# Patient Record
Sex: FEMALE | Race: WHITE | Employment: OTHER | URBAN - METROPOLITAN AREA
[De-identification: names, ages, dates, MRNs, and addresses within clinical notes are randomized per-mention and may not be internally consistent; named-entity substitution may affect disease eponyms.]

---

## 2017-07-06 PROBLEM — I44.7 LBBB (LEFT BUNDLE BRANCH BLOCK): Status: ACTIVE | Noted: 2017-07-06

## 2017-07-20 PROBLEM — I44.2 INTERMITTENT COMPLETE HEART BLOCK (HCC): Status: ACTIVE | Noted: 2017-07-20

## 2018-01-01 ENCOUNTER — CARE COORDINATION (OUTPATIENT)
Dept: CASE MANAGEMENT | Age: 83
End: 2018-01-01

## 2018-01-01 ENCOUNTER — TELEPHONE (OUTPATIENT)
Dept: CARDIOLOGY CLINIC | Age: 83
End: 2018-01-01

## 2018-01-01 ENCOUNTER — CARE COORDINATION (OUTPATIENT)
Dept: CARE COORDINATION | Age: 83
End: 2018-01-01

## 2018-01-01 ENCOUNTER — HOSPITAL ENCOUNTER (INPATIENT)
Age: 83
LOS: 4 days | Discharge: NURSING, REHAB OR LONG-TERM CARE | DRG: 281 | End: 2018-10-15
Attending: EMERGENCY MEDICINE | Admitting: INTERNAL MEDICINE
Payer: MEDICARE

## 2018-01-01 ENCOUNTER — APPOINTMENT (OUTPATIENT)
Dept: GENERAL RADIOLOGY | Age: 83
DRG: 281 | End: 2018-01-01
Payer: MEDICARE

## 2018-01-01 VITALS
HEART RATE: 68 BPM | RESPIRATION RATE: 18 BRPM | BODY MASS INDEX: 23.6 KG/M2 | OXYGEN SATURATION: 96 % | DIASTOLIC BLOOD PRESSURE: 63 MMHG | WEIGHT: 133.2 LBS | SYSTOLIC BLOOD PRESSURE: 146 MMHG | HEIGHT: 63 IN | TEMPERATURE: 98.5 F

## 2018-01-01 DIAGNOSIS — I21.3 ST ELEVATION MYOCARDIAL INFARCTION (STEMI), UNSPECIFIED ARTERY (HCC): Primary | ICD-10-CM

## 2018-01-01 LAB
ANION GAP SERPL CALCULATED.3IONS-SCNC: 17 MMOL/L (ref 7–16)
ANTI-XA LMW HEPARIN: 0.17 U/ML (ref 0.5–1.1)
ANTI-XA LMW HEPARIN: 0.47 U/ML (ref 0.5–1.1)
ANTI-XA LMW HEPARIN: <0.1 U/ML (ref 0.5–1.1)
BASOPHILS ABSOLUTE: 0.04 E9/L (ref 0–0.2)
BASOPHILS RELATIVE PERCENT: 0.4 % (ref 0–2)
BUN BLDV-MCNC: 23 MG/DL (ref 8–23)
CALCIUM SERPL-MCNC: 9.8 MG/DL (ref 8.6–10.2)
CHLORIDE BLD-SCNC: 101 MMOL/L (ref 98–107)
CO2: 22 MMOL/L (ref 22–29)
CREAT SERPL-MCNC: 0.8 MG/DL (ref 0.5–1)
EKG ATRIAL RATE: 61 BPM
EKG P AXIS: 75 DEGREES
EKG P-R INTERVAL: 204 MS
EKG Q-T INTERVAL: 470 MS
EKG QRS DURATION: 142 MS
EKG QTC CALCULATION (BAZETT): 473 MS
EKG R AXIS: 42 DEGREES
EKG T AXIS: 101 DEGREES
EKG VENTRICULAR RATE: 61 BPM
EOSINOPHILS ABSOLUTE: 0.04 E9/L (ref 0.05–0.5)
EOSINOPHILS RELATIVE PERCENT: 0.4 % (ref 0–6)
GFR AFRICAN AMERICAN: >60
GFR NON-AFRICAN AMERICAN: >60 ML/MIN/1.73
GLUCOSE BLD-MCNC: 152 MG/DL (ref 74–109)
HCT VFR BLD CALC: 43.1 % (ref 34–48)
HEMOGLOBIN: 13.7 G/DL (ref 11.5–15.5)
IMMATURE GRANULOCYTES #: 0.03 E9/L
IMMATURE GRANULOCYTES %: 0.3 % (ref 0–5)
INR BLD: 1
LACTIC ACID: 2.9 MMOL/L (ref 0.5–2.2)
LV EF: 60 %
LVEF MODALITY: NORMAL
LYMPHOCYTES ABSOLUTE: 1.61 E9/L (ref 1.5–4)
LYMPHOCYTES RELATIVE PERCENT: 17.2 % (ref 20–42)
MCH RBC QN AUTO: 31.7 PG (ref 26–35)
MCHC RBC AUTO-ENTMCNC: 31.8 % (ref 32–34.5)
MCV RBC AUTO: 99.8 FL (ref 80–99.9)
METER GLUCOSE: 137 MG/DL (ref 70–110)
METER GLUCOSE: 155 MG/DL (ref 70–110)
MONOCYTES ABSOLUTE: 0.74 E9/L (ref 0.1–0.95)
MONOCYTES RELATIVE PERCENT: 7.9 % (ref 2–12)
NEUTROPHILS ABSOLUTE: 6.89 E9/L (ref 1.8–7.3)
NEUTROPHILS RELATIVE PERCENT: 73.8 % (ref 43–80)
PDW BLD-RTO: 14.1 FL (ref 11.5–15)
PLATELET # BLD: 190 E9/L (ref 130–450)
PMV BLD AUTO: 10.2 FL (ref 7–12)
POTASSIUM SERPL-SCNC: 4.1 MMOL/L (ref 3.5–5)
PRO-BNP: 155 PG/ML (ref 0–450)
PROTHROMBIN TIME: 11.8 SEC (ref 9.3–12.4)
RBC # BLD: 4.32 E12/L (ref 3.5–5.5)
SODIUM BLD-SCNC: 140 MMOL/L (ref 132–146)
TROPONIN: 0.79 NG/ML (ref 0–0.03)
TROPONIN: 4.93 NG/ML (ref 0–0.03)
TROPONIN: <0.01 NG/ML (ref 0–0.03)
WBC # BLD: 9.4 E9/L (ref 4.5–11.5)

## 2018-01-01 PROCEDURE — 2140000000 HC CCU INTERMEDIATE R&B

## 2018-01-01 PROCEDURE — 80048 BASIC METABOLIC PNL TOTAL CA: CPT

## 2018-01-01 PROCEDURE — 99223 1ST HOSP IP/OBS HIGH 75: CPT | Performed by: INTERNAL MEDICINE

## 2018-01-01 PROCEDURE — 6370000000 HC RX 637 (ALT 250 FOR IP): Performed by: INTERNAL MEDICINE

## 2018-01-01 PROCEDURE — 85025 COMPLETE CBC W/AUTO DIFF WBC: CPT

## 2018-01-01 PROCEDURE — 99285 EMERGENCY DEPT VISIT HI MDM: CPT

## 2018-01-01 PROCEDURE — 99232 SBSQ HOSP IP/OBS MODERATE 35: CPT | Performed by: INTERNAL MEDICINE

## 2018-01-01 PROCEDURE — 84484 ASSAY OF TROPONIN QUANT: CPT

## 2018-01-01 PROCEDURE — 85610 PROTHROMBIN TIME: CPT

## 2018-01-01 PROCEDURE — 6360000002 HC RX W HCPCS: Performed by: EMERGENCY MEDICINE

## 2018-01-01 PROCEDURE — 85520 HEPARIN ASSAY: CPT

## 2018-01-01 PROCEDURE — 96375 TX/PRO/DX INJ NEW DRUG ADDON: CPT

## 2018-01-01 PROCEDURE — 93005 ELECTROCARDIOGRAM TRACING: CPT | Performed by: EMERGENCY MEDICINE

## 2018-01-01 PROCEDURE — 93306 TTE W/DOPPLER COMPLETE: CPT

## 2018-01-01 PROCEDURE — 96374 THER/PROPH/DIAG INJ IV PUSH: CPT

## 2018-01-01 PROCEDURE — 83605 ASSAY OF LACTIC ACID: CPT

## 2018-01-01 PROCEDURE — 6360000002 HC RX W HCPCS: Performed by: INTERNAL MEDICINE

## 2018-01-01 PROCEDURE — APPSS45 APP SPLIT SHARED TIME 31-45 MINUTES: Performed by: PHYSICIAN ASSISTANT

## 2018-01-01 PROCEDURE — 82962 GLUCOSE BLOOD TEST: CPT

## 2018-01-01 PROCEDURE — 71045 X-RAY EXAM CHEST 1 VIEW: CPT

## 2018-01-01 PROCEDURE — 83880 ASSAY OF NATRIURETIC PEPTIDE: CPT

## 2018-01-01 PROCEDURE — 6370000000 HC RX 637 (ALT 250 FOR IP): Performed by: EMERGENCY MEDICINE

## 2018-01-01 PROCEDURE — 36415 COLL VENOUS BLD VENIPUNCTURE: CPT

## 2018-01-01 PROCEDURE — 94761 N-INVAS EAR/PLS OXIMETRY MLT: CPT

## 2018-01-01 RX ORDER — FENTANYL CITRATE 50 UG/ML
50 INJECTION, SOLUTION INTRAMUSCULAR; INTRAVENOUS ONCE
Status: DISCONTINUED | OUTPATIENT
Start: 2018-01-01 | End: 2018-01-01 | Stop reason: HOSPADM

## 2018-01-01 RX ORDER — ISOSORBIDE MONONITRATE 30 MG/1
30 TABLET, EXTENDED RELEASE ORAL DAILY
Status: DISCONTINUED | OUTPATIENT
Start: 2018-01-01 | End: 2018-01-01 | Stop reason: HOSPADM

## 2018-01-01 RX ORDER — METOPROLOL SUCCINATE 25 MG/1
25 TABLET, EXTENDED RELEASE ORAL DAILY
Status: DISCONTINUED | OUTPATIENT
Start: 2018-01-01 | End: 2018-01-01 | Stop reason: HOSPADM

## 2018-01-01 RX ORDER — CLOPIDOGREL BISULFATE 75 MG/1
300 TABLET ORAL ONCE
Status: COMPLETED | OUTPATIENT
Start: 2018-01-01 | End: 2018-01-01

## 2018-01-01 RX ORDER — CLONIDINE HYDROCHLORIDE 0.1 MG/1
0.05 TABLET ORAL 2 TIMES DAILY
Status: DISCONTINUED | OUTPATIENT
Start: 2018-01-01 | End: 2018-01-01

## 2018-01-01 RX ORDER — M-VIT,TX,IRON,MINS/CALC/FOLIC 27MG-0.4MG
1 TABLET ORAL DAILY
Status: DISCONTINUED | OUTPATIENT
Start: 2018-01-01 | End: 2018-01-01 | Stop reason: HOSPADM

## 2018-01-01 RX ORDER — CLOPIDOGREL BISULFATE 75 MG/1
75 TABLET ORAL DAILY
Status: DISCONTINUED | OUTPATIENT
Start: 2018-01-01 | End: 2018-01-01 | Stop reason: HOSPADM

## 2018-01-01 RX ORDER — ACETAMINOPHEN 500 MG
500 TABLET ORAL EVERY 6 HOURS PRN
Status: DISCONTINUED | OUTPATIENT
Start: 2018-01-01 | End: 2018-01-01 | Stop reason: HOSPADM

## 2018-01-01 RX ORDER — METOPROLOL SUCCINATE 25 MG/1
25 TABLET, EXTENDED RELEASE ORAL DAILY
Qty: 30 TABLET | Refills: 3 | Status: SHIPPED | OUTPATIENT
Start: 2018-01-01

## 2018-01-01 RX ORDER — CLOPIDOGREL BISULFATE 75 MG/1
75 TABLET ORAL DAILY
Qty: 30 TABLET | Refills: 3 | Status: SHIPPED | OUTPATIENT
Start: 2018-01-01

## 2018-01-01 RX ORDER — ISOSORBIDE MONONITRATE 30 MG/1
30 TABLET, EXTENDED RELEASE ORAL DAILY
Qty: 30 TABLET | Refills: 3 | Status: SHIPPED | OUTPATIENT
Start: 2018-01-01

## 2018-01-01 RX ORDER — AMLODIPINE BESYLATE 10 MG/1
10 TABLET ORAL DAILY
Status: DISCONTINUED | OUTPATIENT
Start: 2018-01-01 | End: 2018-01-01 | Stop reason: HOSPADM

## 2018-01-01 RX ORDER — METOPROLOL SUCCINATE 25 MG/1
12.5 TABLET, EXTENDED RELEASE ORAL DAILY
Status: DISCONTINUED | OUTPATIENT
Start: 2018-01-01 | End: 2018-01-01

## 2018-01-01 RX ORDER — HEPARIN SODIUM 10000 [USP'U]/ML
60 INJECTION, SOLUTION INTRAVENOUS; SUBCUTANEOUS ONCE
Status: DISCONTINUED | OUTPATIENT
Start: 2018-01-01 | End: 2018-01-01

## 2018-01-01 RX ORDER — LEVOTHYROXINE SODIUM 0.2 MG/1
200 TABLET ORAL DAILY
Status: DISCONTINUED | OUTPATIENT
Start: 2018-01-01 | End: 2018-01-01 | Stop reason: HOSPADM

## 2018-01-01 RX ORDER — HEPARIN SODIUM 10000 [USP'U]/100ML
12 INJECTION, SOLUTION INTRAVENOUS CONTINUOUS
Status: DISCONTINUED | OUTPATIENT
Start: 2018-01-01 | End: 2018-01-01

## 2018-01-01 RX ORDER — CALCIUM CARBONATE 200(500)MG
500 TABLET,CHEWABLE ORAL DAILY
Status: DISCONTINUED | OUTPATIENT
Start: 2018-01-01 | End: 2018-01-01 | Stop reason: HOSPADM

## 2018-01-01 RX ORDER — LOSARTAN POTASSIUM 25 MG/1
25 TABLET ORAL DAILY
Status: DISCONTINUED | OUTPATIENT
Start: 2018-01-01 | End: 2018-01-01 | Stop reason: HOSPADM

## 2018-01-01 RX ORDER — FENTANYL CITRATE 50 UG/ML
50 INJECTION, SOLUTION INTRAMUSCULAR; INTRAVENOUS ONCE
Status: COMPLETED | OUTPATIENT
Start: 2018-01-01 | End: 2018-01-01

## 2018-01-01 RX ADMIN — ACETAMINOPHEN 500 MG: 500 TABLET, FILM COATED ORAL at 08:54

## 2018-01-01 RX ADMIN — NITROGLYCERIN 0.5 INCH: 20 OINTMENT TOPICAL at 17:59

## 2018-01-01 RX ADMIN — CLOPIDOGREL BISULFATE 75 MG: 75 TABLET ORAL at 08:51

## 2018-01-01 RX ADMIN — LOSARTAN POTASSIUM 25 MG: 25 TABLET, FILM COATED ORAL at 09:19

## 2018-01-01 RX ADMIN — AMLODIPINE BESYLATE 10 MG: 10 TABLET ORAL at 08:51

## 2018-01-01 RX ADMIN — METOPROLOL SUCCINATE 12.5 MG: 25 TABLET, FILM COATED, EXTENDED RELEASE ORAL at 11:29

## 2018-01-01 RX ADMIN — NITROGLYCERIN 1 INCH: 20 OINTMENT TOPICAL at 00:50

## 2018-01-01 RX ADMIN — METOPROLOL SUCCINATE 12.5 MG: 25 TABLET, FILM COATED, EXTENDED RELEASE ORAL at 09:22

## 2018-01-01 RX ADMIN — MULTIPLE VITAMINS W/ MINERALS TAB 1 TABLET: TAB at 08:14

## 2018-01-01 RX ADMIN — MULTIPLE VITAMINS W/ MINERALS TAB 1 TABLET: TAB at 09:19

## 2018-01-01 RX ADMIN — METOPROLOL SUCCINATE 12.5 MG: 25 TABLET, FILM COATED, EXTENDED RELEASE ORAL at 08:51

## 2018-01-01 RX ADMIN — CALCIUM CARBONATE (ANTACID) CHEW TAB 500 MG 500 MG: 500 CHEW TAB at 08:31

## 2018-01-01 RX ADMIN — NITROGLYCERIN 0.5 INCH: 20 OINTMENT TOPICAL at 11:29

## 2018-01-01 RX ADMIN — NITROGLYCERIN 0.5 INCH: 20 OINTMENT TOPICAL at 00:23

## 2018-01-01 RX ADMIN — ACETAMINOPHEN 500 MG: 500 TABLET, FILM COATED ORAL at 20:32

## 2018-01-01 RX ADMIN — AMLODIPINE BESYLATE 10 MG: 10 TABLET ORAL at 09:19

## 2018-01-01 RX ADMIN — AMLODIPINE BESYLATE 10 MG: 10 TABLET ORAL at 20:32

## 2018-01-01 RX ADMIN — MULTIPLE VITAMINS W/ MINERALS TAB 1 TABLET: TAB at 08:32

## 2018-01-01 RX ADMIN — NITROGLYCERIN 0.5 INCH: 20 OINTMENT TOPICAL at 18:07

## 2018-01-01 RX ADMIN — CLONIDINE HYDROCHLORIDE 0.05 MG: 0.1 TABLET ORAL at 20:35

## 2018-01-01 RX ADMIN — SERTRALINE 50 MG: 50 TABLET, FILM COATED ORAL at 21:59

## 2018-01-01 RX ADMIN — CLOPIDOGREL BISULFATE 75 MG: 75 TABLET ORAL at 11:29

## 2018-01-01 RX ADMIN — CLOPIDOGREL BISULFATE 300 MG: 75 TABLET ORAL at 15:17

## 2018-01-01 RX ADMIN — CLONIDINE HYDROCHLORIDE 0.05 MG: 0.1 TABLET ORAL at 08:14

## 2018-01-01 RX ADMIN — NITROGLYCERIN 0.5 INCH: 20 OINTMENT TOPICAL at 06:00

## 2018-01-01 RX ADMIN — AMLODIPINE BESYLATE 10 MG: 10 TABLET ORAL at 08:14

## 2018-01-01 RX ADMIN — NITROGLYCERIN 0.5 INCH: 20 OINTMENT TOPICAL at 00:17

## 2018-01-01 RX ADMIN — NITROGLYCERIN 0.5 INCH: 20 OINTMENT TOPICAL at 17:20

## 2018-01-01 RX ADMIN — SERTRALINE 50 MG: 50 TABLET, FILM COATED ORAL at 20:05

## 2018-01-01 RX ADMIN — NITROGLYCERIN 0.5 INCH: 20 OINTMENT TOPICAL at 12:08

## 2018-01-01 RX ADMIN — LEVOTHYROXINE SODIUM 200 MCG: 200 TABLET ORAL at 06:00

## 2018-01-01 RX ADMIN — LOSARTAN POTASSIUM 25 MG: 25 TABLET, FILM COATED ORAL at 20:32

## 2018-01-01 RX ADMIN — NITROGLYCERIN 1 INCH: 20 OINTMENT TOPICAL at 05:00

## 2018-01-01 RX ADMIN — CLOPIDOGREL BISULFATE 75 MG: 75 TABLET ORAL at 09:19

## 2018-01-01 RX ADMIN — ACETAMINOPHEN 500 MG: 500 TABLET, FILM COATED ORAL at 11:11

## 2018-01-01 RX ADMIN — LOSARTAN POTASSIUM 25 MG: 25 TABLET, FILM COATED ORAL at 08:51

## 2018-01-01 RX ADMIN — LOSARTAN POTASSIUM 25 MG: 25 TABLET, FILM COATED ORAL at 08:32

## 2018-01-01 RX ADMIN — NITROGLYCERIN 0.5 INCH: 20 OINTMENT TOPICAL at 06:28

## 2018-01-01 RX ADMIN — NITROGLYCERIN 0.5 INCH: 20 OINTMENT TOPICAL at 06:22

## 2018-01-01 RX ADMIN — FENTANYL CITRATE 50 MCG: 50 INJECTION, SOLUTION INTRAMUSCULAR; INTRAVENOUS at 15:17

## 2018-01-01 RX ADMIN — METOPROLOL SUCCINATE 12.5 MG: 25 TABLET, FILM COATED, EXTENDED RELEASE ORAL at 08:32

## 2018-01-01 RX ADMIN — LOSARTAN POTASSIUM 25 MG: 25 TABLET, FILM COATED ORAL at 08:14

## 2018-01-01 RX ADMIN — CALCIUM CARBONATE (ANTACID) CHEW TAB 500 MG 500 MG: 500 CHEW TAB at 09:19

## 2018-01-01 RX ADMIN — NITROGLYCERIN 0.5 INCH: 20 OINTMENT TOPICAL at 23:31

## 2018-01-01 RX ADMIN — SERTRALINE 50 MG: 50 TABLET, FILM COATED ORAL at 21:54

## 2018-01-01 RX ADMIN — AMLODIPINE BESYLATE 10 MG: 10 TABLET ORAL at 08:32

## 2018-01-01 RX ADMIN — MULTIPLE VITAMINS W/ MINERALS TAB 1 TABLET: TAB at 08:51

## 2018-01-01 RX ADMIN — SERTRALINE 50 MG: 50 TABLET, FILM COATED ORAL at 20:32

## 2018-01-01 RX ADMIN — CALCIUM CARBONATE (ANTACID) CHEW TAB 500 MG 500 MG: 500 CHEW TAB at 12:55

## 2018-01-01 RX ADMIN — ISOSORBIDE MONONITRATE 30 MG: 30 TABLET, EXTENDED RELEASE ORAL at 13:27

## 2018-01-01 RX ADMIN — ENOXAPARIN SODIUM 60 MG: 60 INJECTION SUBCUTANEOUS at 20:32

## 2018-01-01 RX ADMIN — LEVOTHYROXINE SODIUM 200 MCG: 200 TABLET ORAL at 06:22

## 2018-01-01 RX ADMIN — NITROGLYCERIN 0.5 INCH: 20 OINTMENT TOPICAL at 12:54

## 2018-01-01 RX ADMIN — LEVOTHYROXINE SODIUM 200 MCG: 200 TABLET ORAL at 06:28

## 2018-01-01 RX ADMIN — CLOPIDOGREL BISULFATE 75 MG: 75 TABLET ORAL at 08:32

## 2018-01-01 RX ADMIN — PROCHLORPERAZINE EDISYLATE 10 MG: 5 INJECTION INTRAMUSCULAR; INTRAVENOUS at 15:35

## 2018-01-01 ASSESSMENT — PAIN DESCRIPTION - ONSET
ONSET: ON-GOING
ONSET: ON-GOING
ONSET: GRADUAL
ONSET: ON-GOING

## 2018-01-01 ASSESSMENT — PAIN SCALES - GENERAL
PAINLEVEL_OUTOF10: 2
PAINLEVEL_OUTOF10: 10
PAINLEVEL_OUTOF10: 0
PAINLEVEL_OUTOF10: 1
PAINLEVEL_OUTOF10: 0
PAINLEVEL_OUTOF10: 0
PAINLEVEL_OUTOF10: 7
PAINLEVEL_OUTOF10: 0
PAINLEVEL_OUTOF10: 3
PAINLEVEL_OUTOF10: 2
PAINLEVEL_OUTOF10: 0
PAINLEVEL_OUTOF10: 0
PAINLEVEL_OUTOF10: 3

## 2018-01-01 ASSESSMENT — PAIN DESCRIPTION - PAIN TYPE
TYPE: ACUTE PAIN
TYPE: ACUTE PAIN
TYPE: CHRONIC PAIN
TYPE: CHRONIC PAIN
TYPE: ACUTE PAIN
TYPE: ACUTE PAIN

## 2018-01-01 ASSESSMENT — PAIN DESCRIPTION - LOCATION
LOCATION: CHEST
LOCATION: LEG
LOCATION: HEAD
LOCATION: LEG
LOCATION: CHEST
LOCATION: ABDOMEN

## 2018-01-01 ASSESSMENT — PAIN DESCRIPTION - DESCRIPTORS
DESCRIPTORS: ACHING;DULL;DISCOMFORT
DESCRIPTORS: ACHING;DISCOMFORT;DULL
DESCRIPTORS: ACHING;DISCOMFORT;THROBBING
DESCRIPTORS: DISCOMFORT
DESCRIPTORS: ACHING;DISCOMFORT;THROBBING

## 2018-01-01 ASSESSMENT — PAIN DESCRIPTION - FREQUENCY
FREQUENCY: INTERMITTENT
FREQUENCY: CONTINUOUS
FREQUENCY: INTERMITTENT
FREQUENCY: INTERMITTENT

## 2018-01-01 ASSESSMENT — PAIN DESCRIPTION - PROGRESSION
CLINICAL_PROGRESSION: NOT CHANGED
CLINICAL_PROGRESSION: GRADUALLY IMPROVING
CLINICAL_PROGRESSION: GRADUALLY WORSENING
CLINICAL_PROGRESSION: NOT CHANGED

## 2018-01-01 ASSESSMENT — PAIN DESCRIPTION - ORIENTATION
ORIENTATION: MID
ORIENTATION: RIGHT;LEFT
ORIENTATION: MID
ORIENTATION: LEFT
ORIENTATION: RIGHT;LEFT

## 2018-01-01 ASSESSMENT — ENCOUNTER SYMPTOMS
SHORTNESS OF BREATH: 1
BACK PAIN: 0
ABDOMINAL PAIN: 0

## 2018-06-26 ENCOUNTER — OFFICE VISIT (OUTPATIENT)
Dept: NON INVASIVE DIAGNOSTICS | Age: 83
End: 2018-06-26
Payer: MEDICARE

## 2018-06-26 VITALS
HEART RATE: 78 BPM | RESPIRATION RATE: 16 BRPM | BODY MASS INDEX: 24.84 KG/M2 | WEIGHT: 135 LBS | SYSTOLIC BLOOD PRESSURE: 144 MMHG | HEIGHT: 62 IN | DIASTOLIC BLOOD PRESSURE: 58 MMHG

## 2018-06-26 DIAGNOSIS — I44.2 INTERMITTENT COMPLETE HEART BLOCK (HCC): Primary | ICD-10-CM

## 2018-06-26 DIAGNOSIS — I44.7 LBBB (LEFT BUNDLE BRANCH BLOCK): ICD-10-CM

## 2018-06-26 PROCEDURE — 1090F PRES/ABSN URINE INCON ASSESS: CPT | Performed by: INTERNAL MEDICINE

## 2018-06-26 PROCEDURE — 93000 ELECTROCARDIOGRAM COMPLETE: CPT | Performed by: INTERNAL MEDICINE

## 2018-06-26 PROCEDURE — 99213 OFFICE O/P EST LOW 20 MIN: CPT | Performed by: INTERNAL MEDICINE

## 2018-06-26 PROCEDURE — G8427 DOCREV CUR MEDS BY ELIG CLIN: HCPCS | Performed by: INTERNAL MEDICINE

## 2018-06-26 PROCEDURE — 4040F PNEUMOC VAC/ADMIN/RCVD: CPT | Performed by: INTERNAL MEDICINE

## 2018-06-26 PROCEDURE — 1123F ACP DISCUSS/DSCN MKR DOCD: CPT | Performed by: INTERNAL MEDICINE

## 2018-06-26 PROCEDURE — G8599 NO ASA/ANTIPLAT THER USE RNG: HCPCS | Performed by: INTERNAL MEDICINE

## 2018-06-26 PROCEDURE — 1036F TOBACCO NON-USER: CPT | Performed by: INTERNAL MEDICINE

## 2018-06-26 PROCEDURE — G8420 CALC BMI NORM PARAMETERS: HCPCS | Performed by: INTERNAL MEDICINE

## 2018-06-26 RX ORDER — LOSARTAN POTASSIUM 25 MG/1
TABLET ORAL
Refills: 1 | COMMUNITY
Start: 2018-05-28

## 2018-06-27 ASSESSMENT — ENCOUNTER SYMPTOMS
ORTHOPNEA: 0
WHEEZING: 0
SHORTNESS OF BREATH: 0
COUGH: 0

## 2018-10-11 PROBLEM — I21.3 STEMI (ST ELEVATION MYOCARDIAL INFARCTION) (HCC): Status: ACTIVE | Noted: 2018-01-01

## 2018-10-11 NOTE — ED PROVIDER NOTES
Neurological: She is alert and oriented to person, place, and time. No cranial nerve deficit. Skin: Skin is warm and dry. Psychiatric: She has a normal mood and affect. Her behavior is normal.   Nursing note and vitals reviewed. Procedures    MDM  Number of Diagnoses or Management Options  ST elevation myocardial infarction (STEMI), unspecified artery [facility]):   Diagnosis management comments: This is a 70-year-old female who is DNR CC ED for evaluation of chest pain which began one hour prior to arrival. Patient stable vital signs worse complaining that her pain is getting worse. Patient EKG which showed elevation in inferior leads with reciprocal changes. Patient stated that she did not want any surgical interventions would like medications. Patient was given Plavix and fentanyl which did improve her pain. I discussed the case with Dr. Robert Barnes of cardiology to the heparin drip will be appropriate and be cautious with beta blockers and nitro. I discussed the case with Dr. Benedict Schlatter who agreed to admit the patient. Patient was agreeable with plan.           --------------------------------------------- PAST HISTORY ---------------------------------------------  Past Medical History:  has a past medical history of Arthritis; Cataract; Chest discomfort; Diabetes mellitus ([address] Environmental allergies; H/O food allergy; Hiatal hernia; Hypertension; Intermittent complete heart block (HCC); LBBB (left bundle branch block); Neuropathy; Night sweats; Ringing in ears; Sinus trouble; Thyroid disease; Vertigo; and Wears glasses. Past Surgical History:  has a past surgical history that includes Tonsillectomy; joint replacement; Cholecystectomy; Appendectomy;  section; Inner ear surgery (1950); and other surgical history (2014). Social History:  reports that she quit smoking about 43 years ago. Her smoking use included Cigarettes.  She has never used smokeless tobacco. She reports that she does not drink alcohol or use drugs. Family History: family history is not on file. The patients home medications have been reviewed. Allergies: Hydralazine hcl; Peanuts [peanut oil]; Biaxin [clarithromycin]; Codeine; Cortisone; Darvocet [propoxyphene n-acetaminophen]; Darvon [propoxyphene hcl]; Demerol; Erythromycin; Morphine; Nsaids; Pcn [penicillins];  Procaine hcl; Ultram [tramadol hcl]; and Vioxx    -------------------------------------------------- RESULTS -------------------------------------------------    LABS:  Results for orders placed or performed during the hospital encounter of 49/95/21   Basic Metabolic Panel   Result Value Ref Range    Sodium 140 132 - 146 mmol/L    Potassium 4.1 3.5 - 5.0 mmol/L    Chloride 101 98 - 107 mmol/L    CO2 22 22 - 29 mmol/L    Anion Gap 17 (H) 7 - 16 mmol/L    Glucose 152 (H) 74 - 109 mg/dL    BUN 23 8 - 23 mg/dL    CREATININE 0.8 0.5 - 1.0 mg/dL    GFR Non-African American >60 >=60 mL/min/1.73    GFR African American >60     Calcium 9.8 8.6 - 10.2 mg/dL   CBC auto differential   Result Value Ref Range    WBC 9.4 4.5 - 11.5 E9/L    RBC 4.32 3.50 - 5.50 E12/L    Hemoglobin 13.7 11.5 - 15.5 g/dL    Hematocrit 43.1 34.0 - 48.0 %    MCV 99.8 80.0 - 99.9 fL    MCH 31.7 26.0 - 35.0 pg    MCHC 31.8 (L) 32.0 - 34.5 %    RDW 14.1 11.5 - 15.0 fL    Platelets 846 980 - 249 E9/L    MPV 10.2 7.0 - 12.0 fL    Neutrophils % 73.8 43.0 - 80.0 %    Immature Granulocytes % 0.3 0.0 - 5.0 %    Lymphocytes % 17.2 (L) 20.0 - 42.0 %    Monocytes % 7.9 2.0 - 12.0 %    Eosinophils % 0.4 0.0 - 6.0 %    Basophils % 0.4 0.0 - 2.0 %    Neutrophils # 6.89 1.80 - 7.30 E9/L    Immature Granulocytes # 0.03 E9/L    Lymphocytes # 1.61 1.50 - 4.00 E9/L    Monocytes # 0.74 0.10 - 0.95 E9/L    Eosinophils # 0.04 (L) 0.05 - 0.50 E9/L    Basophils # 0.04 0.00 - 0.20 E9/L   Troponin   Result Value Ref Range    Troponin <0.01 0.00 - 0.03 ng/mL   Brain Natriuretic Peptide   Result Value Ref Range    Pro- 0 - 450 pg/mL   Lactic Acid, Plasma   Result Value Ref Range    Lactic Acid 2.9 (H) 0.5 - 2.2 mmol/L       RADIOLOGY:  XR CHEST PORTABLE   Final Result   No gross focal consolidation.              ------------------------- NURSING NOTES AND VITALS REVIEWED ---------------------------  Date / Time Roomed:  10/11/2018  2:42 PM  ED Bed Assignment:  08/08    The nursing notes within the ED encounter and vital signs as below have been reviewed. Patient Vitals for the past 24 hrs:   BP Temp Pulse Resp SpO2   10/11/18 1653 (!) 121/48 - 69 16 98 %   10/11/18 1615 (!) 118/50 - 59 17 99 %   10/11/18 1533 (!) 129/50 - 91 18 98 %   10/11/18 1444 (!) 138/48 97.1 °F (36.2 °C) 62 14 98 %       Oxygen Saturation Interpretation: Normal    ------------------------------------------ PROGRESS NOTES ------------------------------------------  Re-evaluation(s):  Time: 418  Patients symptoms show no change  Repeat physical examination is not changed    Counseling:  I have spoken with the patient and discussed todays results, in addition to providing specific details for the plan of care and counseling regarding the diagnosis and prognosis. Their questions are answered at this time and they are agreeable with the plan of admission.    --------------------------------- ADDITIONAL PROVIDER NOTES ---------------------------------  Consultations:   Spoke with Dr. Josefa Sanches. Discussed case. They will admit the patient. This patient's ED course included: a personal history and physicial examination, re-evaluation prior to disposition, multiple bedside re-evaluations, IV medications, cardiac monitoring, continuous pulse oximetry and complex medical decision making and emergency management    This patient has remained hemodynamically stable during their ED course. Diagnosis:  1.  ST elevation myocardial infarction (STEMI), unspecified artery ([facility] [address]        Disposition:  Patient's disposition: Admit to telemetry  Patient's condition is

## 2018-10-12 NOTE — PROGRESS NOTES
Dr. Clari Zee notified via perfect serve that patient's 2nd troponin increased to 0.79; VS remain stable, no new or increasing complaints of chest pain, and patient did receive ordered lovenox. Will continue to monitor. 0100: After further review of chart it was noted that troponin level of 0.79 resulted at 2030, and troponin drawn at 2300 is 4.93. Dr. Clari Zee notified of this information, and that nitro paste has been placed on patient. No changes in plan at this time per Dr. Clari Zee; will continue to monitor.

## 2018-10-12 NOTE — CARE COORDINATION
SOCIAL WORK AND DISCHARGE PLANNING: I spoke with fermin cabrales the emergency contact for pt. She said that pt will return to [facility] [place] [facility] and rafael will not be needed. She is to be called first and if available will take pt back if not then sw to set up ambulette. A envelope is in the soft chart.  Merced Luna  10/12/2018

## 2018-10-12 NOTE — PROGRESS NOTES
Dr. Katharyn Gaucher notified via ezzai - how to [place] serve that patient had 9 beats VTach /62; patient cites she did feel \"palpitations\" that she feels often at home. Patient administered ordered cozaar, norvasc, and clonidine at 2035.

## 2018-10-12 NOTE — CONSULTS
 Not on file     Social History Narrative    No narrative on file       Family History:   History reviewed. No pertinent family history. REVIEW OF SYSTEMS:     · Constitutional:+ fatigue, denies fevers, chills or night sweats  · Eyes: Denies visual changes or drainage  · ENT: Denies headaches or hearing loss. No mouth sores or sore throat. No epistaxis   · Cardiovascular: Denies chest pain, pressure or palpitations. No lower extremity swelling. · Respiratory: Denies ESPINO, cough, orthopnea or PND. No hemoptysis   · Gastrointestinal: Denies hematemesis or anorexia. No hematochezia or melena    · Genitourinary: Denies urgency, dysuria or hematuria. · Musculoskeletal: Denies gait disturbance, + weakness and joint complaints  · Integumentary: Denies rash, hives or pruritis   · Neurological: Denies dizziness, headaches or seizures. No numbness or tingling  · Psychiatric: Denies anxiety or depression. · Endocrine: Denies temperature intolerance. No recent weight change. .  · Hematologic/Lymphatic: Denies abnormal bruising or bleeding. No swollen lymph nodes    PHYSICAL EXAM:   /60   Pulse 68   Temp 98.4 °F (36.9 °C) (Oral)   Resp 14   Ht 5' 3\" (1.6 m)   Wt 130 lb 14.4 oz (59.4 kg)   SpO2 94%   BMI 23.19 kg/m²   CONST:  Weak, cachectic female, who appears of stated age. Awake, alert and cooperative. No apparent distress. HEENT:   Head- Normocephalic, atraumatic   Eyes- Conjunctivae pink, anicteric  Throat- Oral mucosa pink and moist  Neck-  No stridor, trachea midline, no jugular venous distention. No carotid bruit. CHEST: Chest symmetrical and non-tender to palpation. No accessory muscle use or intercostal retractions  RESPIRATORY: Lung sounds - clear throughout fields   CARDIOVASCULAR:     Heart Inspection- shows no noted pulsations  Heart Palpation- no heaves or thrills; PMI is non-displaced   Heart Ausculation- Regular rate and rhythm, no murmur.  No s3, s4 or rub   PV: No lower extremity no lymphadenopathy. No masses. trachea midline. Heart: regular rate & rhythm, normal S1 & S2, I/VI (normal physiologic) systolic murmur, S4 gallop. No heave. Lungs: CTA. No accessory muscles. Abd: soft, non-tender. Normal bowel sounds. Neuro: Full ROM X 4, EOMI, no tremors. EXT: No bilateral lower extremity edema  Skin: warm, dry, intact. Good turgor. Psych: A&O x 3, normal behavior, not anxious. Patient seen and examined. Chart, labs & data reviewed. A:  1. Chronic left bundle branch block. However, her ECG is consistent with an inferior ST elevation myocardial infarction. She is now a symptomatic. This appears to have completed itself yesterday. 2. Hypertension  3. Normal pressure hydrocephalus. Status post  shunt. 4. Diabetes  5. Prior intermittent heart block that had resolved with discontinuation of her Cardizem and Procardia. She is still on clonidine without heart block. 6. Left bundle branch block  7. Mild aortic regurgitation  8. She nonsustained VT this morning and has occasional PVCs. Rec:  1. I will try to add a very low dose of Toprol in hopes that this will not cause any recurrence of her heart block. I will stop the clonidine. 2. Imdur  3. Aspirin  4. Echocardiogram.  5. Possible ACE inhibitor depending on her ejection fraction. 6. DNR-CC. We did discuss this with her. She does not want any invasive procedures.     Electronically signed by London Burnett DO on 10/12/2018 at 3:52 PM

## 2018-10-12 NOTE — H&P
tablet, Take 1 tablet by mouth daily. levothyroxine (SYNTHROID) 200 MCG tablet, Take 200 mcg by mouth daily. sertraline (ZOLOFT) 50 MG tablet, Take 50 mg by mouth daily. Sod Fluoride-Ca Carbonate (FLORICAL) 8.3-364 MG CAPS, Take 2 capsules by mouth daily   docusate sodium (COLACE) 100 MG capsule, Take 100 mg by mouth as needed for Constipation. polyethyl glycol-propyl glycol 0.4-0.3 % (SYSTANE) 0.4-0.3 % ophthalmic solution, Apply 1 drop to eye as needed Both eyes    Allergies:  Hydralazine hcl; Peanuts [peanut oil]; Biaxin [clarithromycin]; Codeine; Cortisone; Darvocet [propoxyphene n-acetaminophen]; Darvon [propoxyphene hcl]; Demerol; Erythromycin; Morphine; Nsaids; Pcn [penicillins]; Procaine hcl; Ultram [tramadol hcl]; and Vioxx    Social History:   Social History     Social History    Marital status:      Spouse name: N/A    Number of children: N/A    Years of education: N/A     Occupational History    Not on file. Social History Main Topics    Smoking status: Former Smoker     Types: Cigarettes     Quit date: 1975    Smokeless tobacco: Never Used    Alcohol use No    Drug use: No    Sexual activity: Not on file     Other Topics Concern    Not on file     Social History Narrative    No narrative on file         Family History:   History reviewed. No pertinent family history.     REVIEW OF SYSTEMS:    General ROS: negative  Hematological and Lymphatic ROS: negative  Endocrine ROS: negative  Respiratory ROS: no cough,  wheezing  or shortness of breath,   Cardiovascular ROS: positive for chest pain  Gastrointestinal ROS: no abdominal pain, change in bowel habits, or black or bloody stools  Genito-Urinary ROS: no dysuria, trouble voiding, or hematuria  Neurological ROS: no TIA or stroke symptoms  negative    Vitals:  /60   Pulse 71   Temp 98.4 °F (36.9 °C) (Oral)   Resp 14   Ht 5' 3\" (1.6 m)   Wt 130 lb 14.4 oz (59.4 kg)   SpO2 94%   BMI 23.19 kg/m²     PHYSICAL EXAM:  General:

## 2018-10-13 NOTE — PROGRESS NOTES
Reason for follow up:  ACS; HTN    Subjective:  No more CP: resolved since admission. Shoulders on both sides are sore. No SOB. Objective:    No distress. No events overnight. Scheduled Meds:   calcium carbonate  500 mg Oral Daily    metoprolol succinate  12.5 mg Oral Daily    nitroglycerin  0.5 inch Topical 4 times per day    clopidogrel  75 mg Oral Daily    fentanNYL  50 mcg Intravenous Once    levothyroxine  200 mcg Oral Daily    sertraline  50 mg Oral Daily    therapeutic multivitamin-minerals  1 tablet Oral Daily    amLODIPine  10 mg Oral Daily    losartan  25 mg Oral Daily    fentanNYL  50 mcg Intravenous Once         Intake/Output Summary (Last 24 hours) at 10/13/18 1246  Last data filed at 10/13/18 0554   Gross per 24 hour   Intake              480 ml   Output              675 ml   Net             -195 ml       Patient Vitals for the past 96 hrs (Last 3 readings):   Weight   10/13/18 0554 130 lb (59 kg)   10/12/18 0644 130 lb 14.4 oz (59.4 kg)   10/11/18 1941 142 lb 4 oz (64.5 kg)          PE:   /60   Pulse 74   Temp 98.2 °F (36.8 °C) (Oral)   Resp 15   Ht 5' 3\" (1.6 m)   Wt 130 lb (59 kg)   SpO2 94%   BMI 23.03 kg/m²   CONST: In general, this is a well developed, elderly female who appears of stated age. Awake, alert, cooperative, no apparent distress. Throat: Oral mucosa pink and moist.  HEENT: Head- normocephalic, atraumatic; Eyes:conjunctivae pink, no noted xanthomas. Neck: no stridor, no noted enlargement of the thyroid,symmetric, no tenderness, no jugular venous distention. No carotid bruit noted. CHEST: Symmetrical and non-tender to palpation. No noted accessory muscle use or intercostal retractions. LUNGS: diminished AE b/l; no creps; no ronchi.    CARDIOVASCULAR:   Heart Inspection shows no noted pulsations  Heart Palpation: no heaves or thrills, PMI in 5th ISC near left midclavicular line   Heart Ausculation -Normal S1 and S2, RRR, no murmur, s3, s4 or rub noted. PV: no extremity edema. No varicosities. Pedal pulses palpable, no clubbing or cyanosis   ABDOMEN: Soft, non-tender to light palpation. Bowel sounds present. No palpable masses no hepatosplenomegaly or splenomegaly; no abdominal bruit / pulsation  MS: Good muscle strength and tone. Not atrophy or abnormal movements. : deferred. SKIN: Warm and dry no statis dermatitis or ulcers. NEURO / PSYCH: Oriented to person, place and time. Speech clear and appropriate. Follows all commands. Pleasant affect. Monitor: SR      Lab Review       Last 3 Troponin:    Lab Results   Component Value Date    TROPONINI 4.93 10/11/2018    TROPONINI 0.79 10/11/2018    TROPONINI <0.01 10/11/2018        Recent Labs      10/11/18   1515   WBC  9.4   HGB  13.7   HCT  43.1   PLT  190       Recent Labs      10/11/18   1515   NA  140   K  4.1   CL  101   CO2  22   BUN  23   CREATININE  0.8         Recent Labs      10/11/18   2011   INR  1.0       Echo 3/15: EF 65%, mild LVH, stage I DD, trace MR, mild TR, mild AI, PASP 31    Assessment:  1. Presented with CP and enzyme pattern consistent with ACS. She is symptom free now. Does not want any invasive procedures. ECHO is pending. On DAPT, BB, nitrates. 2. HTN: controlled. 3. NPH s/p  shunt. 4. Chronic LBBB. Prior HB resolved with discontinuation of Cardizem. Tolerating Toprol  5. DM      Plan:  1. Continue DAPT, BB and nitrates. 2. ECHO has been ordered will review. 3. Patient wishes conservative management. Will continue medical therapy. Dr. Pema Cook MD.  [id number] [facility].

## 2018-10-15 PROBLEM — I21.3 STEMI (ST ELEVATION MYOCARDIAL INFARCTION) (HCC): Status: RESOLVED | Noted: 2018-01-01 | Resolved: 2018-01-01

## 2018-10-15 NOTE — PROGRESS NOTES
PV: no extremity edema. No varicosities. Pedal pulses palpable, no clubbing or cyanosis   ABDOMEN: Soft, non-tender to light palpation. Bowel sounds present. No palpable masses no hepatosplenomegaly or splenomegaly; no abdominal bruit / pulsation  MS: Good muscle strength and tone. Not atrophy or abnormal movements. : deferred. SKIN: Warm and dry no statis dermatitis or ulcers. NEURO / PSYCH: Oriented to person, place and time. Speech clear and appropriate. Follows all commands. Pleasant affect. Monitor: SR      Echo 3/15: EF 65%, mild LVH, stage I DD, trace MR, mild TR, mild AI, PASP 31     Assessment:  1. Presented with CP and enzyme pattern consistent with ACS. She is symptom free now. Does not want any invasive procedures. ECHO is pending. On DAPT, BB, nitrates. 2. HTN: controlled. 3. NPH s/p  shunt. 4. Chronic LBBB. Prior HB resolved with discontinuation of Cardizem. Tolerating Toprol  5. DM      Plan:  1. Increase Toprol today. 2. Will change nitro patch to Imdur  3. ECHo is pending  4. If she is medically stable and ready for discharge can be discharged later today. ECHO can be done as OP. She is on adequate medical therapy for LV dysfunction if present. No CHF on exam.       Dr. Chuy Alvarez MD.  [id number] [facility].

## 2018-10-15 NOTE — PROGRESS NOTES
pneumothorax. The visualized osseous structures demonstrate degenerative changes. Lines and Tubes: A right-sided ventriculoperitoneal shunt is identified. No gross focal consolidation. Assessment:    Patient Active Problem List   Diagnosis    Normal pressure hydrocephalus s/p lumbar drain insertion    Hypertension    Stroke (cerebrum) ([address]    Essential hypertension, malignant    LBBB (left bundle branch block)    Intermittent complete heart block ([address]    STEMI (ST elevation myocardial infarction) ([address]       Plan: To ANSON Moss  4:43 PM  10/15/2018    NOTE: This report was transcribed using voice recognition software.  Every effort was made to ensure accuracy; however, inadvertent transcription errors may be present

## 2018-10-16 NOTE — CARE COORDINATION
[facility]    Call within 2 business days of discharge: Yes    Patient: Partha Sheldon Patient : 1925   MRN: [id number]  Reason for Admission: There are no discharge diagnoses documented for the most recent discharge. Discharge Date: 10/15/18 RARS: Readmission Risk Score: 9     Spoke with: Astrid Ayon @ [address] services provided:  Obtained and reviewed discharge summary and/or continuity of care documents  Education of patient/family/caregiver/guardian to support self-management-review of discharge instructions with Sammie  Assessment and support for treatment adherence and medication management-review of any medication questions and cg support  with [address] Transitions 24 Hour Call    Care Transitions Interventions         Follow Up:  CTC for phone visit outreach to Anastasia's AL facility. Bhavya Lentz is under [facility]. Astrid Ayon is her nurse today and states Bhavya Lentz is doing well. All paperwork was received, and there were no questions. All medications with no questions. Anastasia's main family is two daughters named in Contacts. She is resting quietly, pleasant and no voiced complaints. Astrid Ayon is agreeable that CTC Central Team may continue to follow with Bhavya Lentz as I explained she is under the [facility] program and we would like to follow for 90 days.   Future Appointments  Date Time Provider Keely Morris   3/19/2019 2:00 PM Rochelle Tran MD AFL[facility] None   2019 12:00 PM Lindy Burrell, APRN - CNS ELECTRO PHYS [facility]       Sonya Jewell RN

## 2018-10-30 NOTE — DISCHARGE SUMMARY
Physician Discharge Summary     Patient ID:  Gaudencio Cage  [id number]  41 y.o.  12/14/1925    Admit date: 10/11/2018    Discharge date and time: 10/15/2018  9:00 PM     Admission Diagnoses: STEMI (ST elevation myocardial infarction) ([address] [I21.3]  STEMI (ST elevation myocardial infarction) [facility]) [I21.3]    Discharge Diagnoses: STEMI    Consults: cardiology    Procedures: none    Hospital Course: presented with chest pain. Patient declined invasive procedures. Had STEMI. Treated medically. Seen by cardiology. XR CHEST PORTABLE   Final Result   No gross focal consolidation. No results found for this or any previous visit (from the past 336 hour(s)). Discharge Exam:  See progress note from today    Disposition: assisted living facility    Patient Instructions:   Discharge Medication List as of 10/15/2018  5:01 PM      START taking these medications    Details   isosorbide mononitrate (IMDUR) 30 MG extended release tablet Take 1 tablet by mouth daily, Disp-30 tablet, R-3Normal      metoprolol succinate (TOPROL XL) 25 MG extended release tablet Take 1 tablet by mouth daily, Disp-30 tablet, R-3Normal      clopidogrel (PLAVIX) 75 MG tablet Take 1 tablet by mouth daily, Disp-30 tablet, R-3Normal         CONTINUE these medications which have NOT CHANGED    Details   losartan (COZAAR) 25 MG tablet TAKE ONE 1  TABLET BY MOUTH DAILY   STO, R-1Historical Med      amLODIPine (NORVASC) 10 MG tablet Take 10 mg by mouth daily Historical Med      cloNIDine (CATAPRES) 0.1 MG/24HR Place 1 patch onto the skin once a week WEDNESDAYHistorical Med      acetaminophen (TYLENOL) 500 MG tablet Take 500 mg by mouth every 6 hours as needed for PainHistorical Med      calcium carbonate (TUMS) 500 MG chewable tablet Take 2-3 tablets by mouth daily as needed for Heartburn Historical Med      polyethylene glycol (GLYCOLAX) powder Take 17 g by mouth daily as needed. Historical Med      therapeutic multivitamin-minerals

## 2019-01-01 ENCOUNTER — TELEPHONE (OUTPATIENT)
Dept: ADMINISTRATIVE | Age: 84
End: 2019-01-01

## 2019-01-01 ENCOUNTER — APPOINTMENT (OUTPATIENT)
Dept: CT IMAGING | Age: 84
DRG: 084 | End: 2019-01-01
Payer: MEDICARE

## 2019-01-01 ENCOUNTER — CARE COORDINATION (OUTPATIENT)
Dept: CASE MANAGEMENT | Age: 84
End: 2019-01-01

## 2019-01-01 ENCOUNTER — APPOINTMENT (OUTPATIENT)
Dept: GENERAL RADIOLOGY | Age: 84
End: 2019-01-01
Payer: MEDICARE

## 2019-01-01 ENCOUNTER — HOSPITAL ENCOUNTER (EMERGENCY)
Age: 84
Discharge: HOME | End: 2019-06-21
Attending: EMERGENCY MEDICINE
Payer: MEDICARE

## 2019-01-01 ENCOUNTER — APPOINTMENT (OUTPATIENT)
Dept: GENERAL RADIOLOGY | Age: 84
DRG: 084 | End: 2019-01-01
Payer: MEDICARE

## 2019-01-01 ENCOUNTER — CARE COORDINATION (OUTPATIENT)
Dept: CARE COORDINATION | Age: 84
End: 2019-01-01

## 2019-01-01 ENCOUNTER — HOSPITAL ENCOUNTER (INPATIENT)
Age: 84
LOS: 1 days | Discharge: HOSPICE | DRG: 084 | End: 2019-07-01
Attending: EMERGENCY MEDICINE | Admitting: INTERNAL MEDICINE
Payer: MEDICARE

## 2019-01-01 VITALS
WEIGHT: 133 LBS | BODY MASS INDEX: 23.57 KG/M2 | TEMPERATURE: 96.9 F | RESPIRATION RATE: 20 BRPM | OXYGEN SATURATION: 97 % | HEART RATE: 60 BPM | SYSTOLIC BLOOD PRESSURE: 165 MMHG | HEIGHT: 63 IN | DIASTOLIC BLOOD PRESSURE: 57 MMHG

## 2019-01-01 VITALS
OXYGEN SATURATION: 89 % | SYSTOLIC BLOOD PRESSURE: 143 MMHG | HEART RATE: 75 BPM | TEMPERATURE: 95.3 F | RESPIRATION RATE: 10 BRPM | DIASTOLIC BLOOD PRESSURE: 63 MMHG | BODY MASS INDEX: 26.5 KG/M2 | HEIGHT: 60 IN | WEIGHT: 135 LBS

## 2019-01-01 DIAGNOSIS — R35.89 POLYURIA: ICD-10-CM

## 2019-01-01 DIAGNOSIS — R20.0 NUMBNESS: Primary | ICD-10-CM

## 2019-01-01 DIAGNOSIS — S06.5XAA SUBDURAL HEMATOMA: Primary | ICD-10-CM

## 2019-01-01 LAB
ALBUMIN SERPL-MCNC: 3.6 G/DL (ref 3.5–5.2)
ALP BLD-CCNC: 54 U/L (ref 35–104)
ALT SERPL-CCNC: 16 U/L (ref 0–32)
ANION GAP SERPL CALCULATED.3IONS-SCNC: 13 MMOL/L (ref 7–16)
ANION GAP SERPL CALCULATED.3IONS-SCNC: 14 MMOL/L (ref 7–16)
AST SERPL-CCNC: 28 U/L (ref 0–31)
BASOPHILS ABSOLUTE: 0 E9/L (ref 0–0.2)
BASOPHILS ABSOLUTE: 0.04 E9/L (ref 0–0.2)
BASOPHILS RELATIVE PERCENT: 0.3 % (ref 0–2)
BASOPHILS RELATIVE PERCENT: 0.7 % (ref 0–2)
BILIRUB SERPL-MCNC: 0.5 MG/DL (ref 0–1.2)
BILIRUBIN URINE: NEGATIVE
BLOOD, URINE: NEGATIVE
BUN BLDV-MCNC: 28 MG/DL (ref 8–23)
BUN BLDV-MCNC: 35 MG/DL (ref 8–23)
BURR CELLS: ABNORMAL
CALCIUM SERPL-MCNC: 10.6 MG/DL (ref 8.6–10.2)
CALCIUM SERPL-MCNC: 9.9 MG/DL (ref 8.6–10.2)
CHLORIDE BLD-SCNC: 103 MMOL/L (ref 98–107)
CHLORIDE BLD-SCNC: 106 MMOL/L (ref 98–107)
CLARITY: CLEAR
CO2: 26 MMOL/L (ref 22–29)
CO2: 30 MMOL/L (ref 22–29)
COLOR: YELLOW
CREAT SERPL-MCNC: 0.9 MG/DL (ref 0.5–1)
CREAT SERPL-MCNC: 1.2 MG/DL (ref 0.5–1)
EKG ATRIAL RATE: 58 BPM
EKG ATRIAL RATE: 80 BPM
EKG P AXIS: 66 DEGREES
EKG P AXIS: 70 DEGREES
EKG P-R INTERVAL: 182 MS
EKG P-R INTERVAL: 194 MS
EKG Q-T INTERVAL: 418 MS
EKG Q-T INTERVAL: 466 MS
EKG QRS DURATION: 134 MS
EKG QRS DURATION: 146 MS
EKG QTC CALCULATION (BAZETT): 457 MS
EKG QTC CALCULATION (BAZETT): 482 MS
EKG R AXIS: 124 DEGREES
EKG R AXIS: 9 DEGREES
EKG T AXIS: -33 DEGREES
EKG T AXIS: -65 DEGREES
EKG VENTRICULAR RATE: 58 BPM
EKG VENTRICULAR RATE: 80 BPM
EOSINOPHILS ABSOLUTE: 0 E9/L (ref 0.05–0.5)
EOSINOPHILS ABSOLUTE: 0.21 E9/L (ref 0.05–0.5)
EOSINOPHILS RELATIVE PERCENT: 0 % (ref 0–6)
EOSINOPHILS RELATIVE PERCENT: 3.6 % (ref 0–6)
GFR AFRICAN AMERICAN: 51
GFR AFRICAN AMERICAN: >60
GFR NON-AFRICAN AMERICAN: 42 ML/MIN/1.73
GFR NON-AFRICAN AMERICAN: 58 ML/MIN/1.73
GLUCOSE BLD-MCNC: 169 MG/DL (ref 74–99)
GLUCOSE BLD-MCNC: 169 MG/DL (ref 74–99)
GLUCOSE URINE: NEGATIVE MG/DL
HCT VFR BLD CALC: 43.4 % (ref 34–48)
HCT VFR BLD CALC: 44.7 % (ref 34–48)
HEMOGLOBIN: 13.9 G/DL (ref 11.5–15.5)
HEMOGLOBIN: 14.6 G/DL (ref 11.5–15.5)
IMMATURE GRANULOCYTES #: 0.03 E9/L
IMMATURE GRANULOCYTES %: 0.5 % (ref 0–5)
KETONES, URINE: NEGATIVE MG/DL
LEUKOCYTE ESTERASE, URINE: NEGATIVE
LYMPHOCYTES ABSOLUTE: 0.92 E9/L (ref 1.5–4)
LYMPHOCYTES ABSOLUTE: 1.73 E9/L (ref 1.5–4)
LYMPHOCYTES RELATIVE PERCENT: 13.2 % (ref 20–42)
LYMPHOCYTES RELATIVE PERCENT: 29.4 % (ref 20–42)
MAGNESIUM: 2.6 MG/DL (ref 1.6–2.6)
MCH RBC QN AUTO: 32.4 PG (ref 26–35)
MCH RBC QN AUTO: 32.9 PG (ref 26–35)
MCHC RBC AUTO-ENTMCNC: 32 % (ref 32–34.5)
MCHC RBC AUTO-ENTMCNC: 32.7 % (ref 32–34.5)
MCV RBC AUTO: 102.8 FL (ref 80–99.9)
MCV RBC AUTO: 99.3 FL (ref 80–99.9)
MONOCYTES ABSOLUTE: 0.8 E9/L (ref 0.1–0.95)
MONOCYTES ABSOLUTE: 0.92 E9/L (ref 0.1–0.95)
MONOCYTES RELATIVE PERCENT: 13.2 % (ref 2–12)
MONOCYTES RELATIVE PERCENT: 13.6 % (ref 2–12)
NEUTROPHILS ABSOLUTE: 3.08 E9/L (ref 1.8–7.3)
NEUTROPHILS ABSOLUTE: 5.25 E9/L (ref 1.8–7.3)
NEUTROPHILS RELATIVE PERCENT: 52.2 % (ref 43–80)
NEUTROPHILS RELATIVE PERCENT: 73.7 % (ref 43–80)
NITRITE, URINE: NEGATIVE
OVALOCYTES: ABNORMAL
PDW BLD-RTO: 14.3 FL (ref 11.5–15)
PDW BLD-RTO: 14.6 FL (ref 11.5–15)
PH UA: 7.5 (ref 5–9)
PLATELET # BLD: 187 E9/L (ref 130–450)
PLATELET # BLD: 191 E9/L (ref 130–450)
PMV BLD AUTO: 10.6 FL (ref 7–12)
PMV BLD AUTO: 9.9 FL (ref 7–12)
POIKILOCYTES: ABNORMAL
POTASSIUM REFLEX MAGNESIUM: 4.4 MMOL/L (ref 3.5–5)
POTASSIUM SERPL-SCNC: 4.2 MMOL/L (ref 3.5–5)
PROTEIN UA: NEGATIVE MG/DL
RBC # BLD: 4.22 E12/L (ref 3.5–5.5)
RBC # BLD: 4.5 E12/L (ref 3.5–5.5)
SODIUM BLD-SCNC: 142 MMOL/L (ref 132–146)
SODIUM BLD-SCNC: 150 MMOL/L (ref 132–146)
SPECIFIC GRAVITY UA: 1.01 (ref 1–1.03)
TOTAL PROTEIN: 6.7 G/DL (ref 6.4–8.3)
TROPONIN: <0.01 NG/ML (ref 0–0.03)
UROBILINOGEN, URINE: 0.2 E.U./DL
WBC # BLD: 5.9 E9/L (ref 4.5–11.5)
WBC # BLD: 7.1 E9/L (ref 4.5–11.5)

## 2019-01-01 PROCEDURE — 93010 ELECTROCARDIOGRAM REPORT: CPT | Performed by: INTERNAL MEDICINE

## 2019-01-01 PROCEDURE — 6370000000 HC RX 637 (ALT 250 FOR IP): Performed by: INTERNAL MEDICINE

## 2019-01-01 PROCEDURE — 93005 ELECTROCARDIOGRAM TRACING: CPT | Performed by: STUDENT IN AN ORGANIZED HEALTH CARE EDUCATION/TRAINING PROGRAM

## 2019-01-01 PROCEDURE — 2500000003 HC RX 250 WO HCPCS: Performed by: INTERNAL MEDICINE

## 2019-01-01 PROCEDURE — 2580000003 HC RX 258: Performed by: INTERNAL MEDICINE

## 2019-01-01 PROCEDURE — 81003 URINALYSIS AUTO W/O SCOPE: CPT

## 2019-01-01 PROCEDURE — 99285 EMERGENCY DEPT VISIT HI MDM: CPT

## 2019-01-01 PROCEDURE — 80048 BASIC METABOLIC PNL TOTAL CA: CPT

## 2019-01-01 PROCEDURE — 93005 ELECTROCARDIOGRAM TRACING: CPT | Performed by: EMERGENCY MEDICINE

## 2019-01-01 PROCEDURE — 99284 EMERGENCY DEPT VISIT MOD MDM: CPT

## 2019-01-01 PROCEDURE — 2700000000 HC OXYGEN THERAPY PER DAY

## 2019-01-01 PROCEDURE — 70450 CT HEAD/BRAIN W/O DYE: CPT

## 2019-01-01 PROCEDURE — 74022 RADEX COMPL AQT ABD SERIES: CPT

## 2019-01-01 PROCEDURE — 71045 X-RAY EXAM CHEST 1 VIEW: CPT

## 2019-01-01 PROCEDURE — 6360000002 HC RX W HCPCS: Performed by: STUDENT IN AN ORGANIZED HEALTH CARE EDUCATION/TRAINING PROGRAM

## 2019-01-01 PROCEDURE — 72125 CT NECK SPINE W/O DYE: CPT

## 2019-01-01 PROCEDURE — 36415 COLL VENOUS BLD VENIPUNCTURE: CPT

## 2019-01-01 PROCEDURE — 6360000002 HC RX W HCPCS: Performed by: INTERNAL MEDICINE

## 2019-01-01 PROCEDURE — 80053 COMPREHEN METABOLIC PANEL: CPT

## 2019-01-01 PROCEDURE — 84484 ASSAY OF TROPONIN QUANT: CPT

## 2019-01-01 PROCEDURE — 83735 ASSAY OF MAGNESIUM: CPT

## 2019-01-01 PROCEDURE — 85025 COMPLETE CBC W/AUTO DIFF WBC: CPT

## 2019-01-01 PROCEDURE — 1200000000 HC SEMI PRIVATE

## 2019-01-01 RX ORDER — LEVOTHYROXINE SODIUM 0.2 MG/1
200 TABLET ORAL DAILY
Status: DISCONTINUED | OUTPATIENT
Start: 2019-01-01 | End: 2019-01-01 | Stop reason: HOSPADM

## 2019-01-01 RX ORDER — SODIUM CHLORIDE 0.9 % (FLUSH) 0.9 %
10 SYRINGE (ML) INJECTION PRN
Status: DISCONTINUED | OUTPATIENT
Start: 2019-01-01 | End: 2019-01-01 | Stop reason: HOSPADM

## 2019-01-01 RX ORDER — SODIUM CHLORIDE 0.9 % (FLUSH) 0.9 %
10 SYRINGE (ML) INJECTION EVERY 12 HOURS SCHEDULED
Status: DISCONTINUED | OUTPATIENT
Start: 2019-01-01 | End: 2019-01-01 | Stop reason: HOSPADM

## 2019-01-01 RX ORDER — METOCLOPRAMIDE HYDROCHLORIDE 5 MG/ML
10 INJECTION INTRAMUSCULAR; INTRAVENOUS ONCE
Status: COMPLETED | OUTPATIENT
Start: 2019-01-01 | End: 2019-01-01

## 2019-01-01 RX ORDER — NEOMYCIN SULFATE, POLYMYXIN B SULFATE AND DEXAMETHASONE 3.5; 10000; 1 MG/ML; [USP'U]/ML; MG/ML
1 SUSPENSION/ DROPS OPHTHALMIC
COMMUNITY

## 2019-01-01 RX ORDER — ACETAMINOPHEN 325 MG/1
650 TABLET ORAL EVERY 4 HOURS PRN
Status: DISCONTINUED | OUTPATIENT
Start: 2019-01-01 | End: 2019-01-01 | Stop reason: SDUPTHER

## 2019-01-01 RX ORDER — ISOSORBIDE MONONITRATE 30 MG/1
30 TABLET, EXTENDED RELEASE ORAL DAILY
Status: DISCONTINUED | OUTPATIENT
Start: 2019-01-01 | End: 2019-01-01 | Stop reason: HOSPADM

## 2019-01-01 RX ORDER — WHEAT DEXTRIN 3 G/3.8 G
4 POWDER (GRAM) ORAL
COMMUNITY

## 2019-01-01 RX ORDER — METOPROLOL SUCCINATE 25 MG/1
25 TABLET, EXTENDED RELEASE ORAL DAILY
Status: DISCONTINUED | OUTPATIENT
Start: 2019-01-01 | End: 2019-01-01 | Stop reason: HOSPADM

## 2019-01-01 RX ORDER — CLINDAMYCIN HYDROCHLORIDE 150 MG/1
150 CAPSULE ORAL 3 TIMES DAILY
COMMUNITY

## 2019-01-01 RX ORDER — CLINDAMYCIN HYDROCHLORIDE 150 MG/1
150 CAPSULE ORAL 3 TIMES DAILY
Status: DISCONTINUED | OUTPATIENT
Start: 2019-01-01 | End: 2019-01-01 | Stop reason: HOSPADM

## 2019-01-01 RX ORDER — ACETAMINOPHEN 500 MG
500 TABLET ORAL EVERY 6 HOURS PRN
Status: DISCONTINUED | OUTPATIENT
Start: 2019-01-01 | End: 2019-01-01 | Stop reason: HOSPADM

## 2019-01-01 RX ORDER — GLYCOPYRROLATE 0.2 MG/ML
0.2 INJECTION INTRAMUSCULAR; INTRAVENOUS 4 TIMES DAILY
Status: DISCONTINUED | OUTPATIENT
Start: 2019-01-01 | End: 2019-01-01 | Stop reason: HOSPADM

## 2019-01-01 RX ORDER — LORAZEPAM 2 MG/ML
1 INJECTION INTRAMUSCULAR EVERY 6 HOURS PRN
Status: DISCONTINUED | OUTPATIENT
Start: 2019-01-01 | End: 2019-01-01 | Stop reason: HOSPADM

## 2019-01-01 RX ORDER — CLONIDINE HYDROCHLORIDE 0.1 MG/1
0.1 TABLET ORAL 2 TIMES DAILY
Status: DISCONTINUED | OUTPATIENT
Start: 2019-01-01 | End: 2019-01-01 | Stop reason: HOSPADM

## 2019-01-01 RX ORDER — ONDANSETRON 2 MG/ML
4 INJECTION INTRAMUSCULAR; INTRAVENOUS ONCE
Status: COMPLETED | OUTPATIENT
Start: 2019-01-01 | End: 2019-01-01

## 2019-01-01 RX ORDER — AMLODIPINE BESYLATE 10 MG/1
10 TABLET ORAL DAILY
Status: DISCONTINUED | OUTPATIENT
Start: 2019-01-01 | End: 2019-01-01 | Stop reason: HOSPADM

## 2019-01-01 RX ORDER — NEOMYCIN SULFATE, POLYMYXIN B SULFATE AND DEXAMETHASONE 3.5; 10000; 1 MG/ML; [USP'U]/ML; MG/ML
1 SUSPENSION/ DROPS OPHTHALMIC EVERY 8 HOURS SCHEDULED
Status: DISCONTINUED | OUTPATIENT
Start: 2019-01-01 | End: 2019-01-01 | Stop reason: HOSPADM

## 2019-01-01 RX ORDER — NYSTATIN 100000 U/G
CREAM TOPICAL 2 TIMES DAILY
COMMUNITY

## 2019-01-01 RX ORDER — ONDANSETRON 2 MG/ML
4 INJECTION INTRAMUSCULAR; INTRAVENOUS EVERY 6 HOURS PRN
Status: DISCONTINUED | OUTPATIENT
Start: 2019-01-01 | End: 2019-01-01 | Stop reason: HOSPADM

## 2019-01-01 RX ORDER — LOSARTAN POTASSIUM 25 MG/1
25 TABLET ORAL DAILY
Status: DISCONTINUED | OUTPATIENT
Start: 2019-01-01 | End: 2019-01-01 | Stop reason: HOSPADM

## 2019-01-01 RX ADMIN — GLYCOPYRROLATE 0.2 MG: 0.2 INJECTION, SOLUTION INTRAMUSCULAR; INTRAVENOUS at 14:08

## 2019-01-01 RX ADMIN — METOCLOPRAMIDE 10 MG: 5 INJECTION, SOLUTION INTRAMUSCULAR; INTRAVENOUS at 14:44

## 2019-01-01 RX ADMIN — NEOMYCIN SULFATE, POLYMYXIN B SULFATE AND DEXAMETHASONE 1 DROP: 3.5; 10000; 1 SUSPENSION/ DROPS OPHTHALMIC at 06:07

## 2019-01-01 RX ADMIN — ONDANSETRON 4 MG: 2 INJECTION INTRAMUSCULAR; INTRAVENOUS at 14:00

## 2019-01-01 RX ADMIN — Medication 10 ML: at 08:37

## 2019-01-01 RX ADMIN — Medication 10 ML: at 09:00

## 2019-01-01 RX ADMIN — Medication 10 ML: at 20:46

## 2019-01-01 RX ADMIN — GLYCOPYRROLATE 0.2 MG: 0.2 INJECTION, SOLUTION INTRAMUSCULAR; INTRAVENOUS at 20:25

## 2019-01-01 RX ADMIN — NEOMYCIN SULFATE, POLYMYXIN B SULFATE AND DEXAMETHASONE 1 DROP: 3.5; 10000; 1 SUSPENSION/ DROPS OPHTHALMIC at 14:01

## 2019-01-01 RX ADMIN — GLYCOPYRROLATE 0.2 MG: 0.2 INJECTION, SOLUTION INTRAMUSCULAR; INTRAVENOUS at 17:18

## 2019-01-01 RX ADMIN — NEOMYCIN SULFATE, POLYMYXIN B SULFATE AND DEXAMETHASONE 1 DROP: 3.5; 10000; 1 SUSPENSION/ DROPS OPHTHALMIC at 20:26

## 2019-01-01 RX ADMIN — LORAZEPAM 1 MG: 2 INJECTION INTRAMUSCULAR; INTRAVENOUS at 14:59

## 2019-01-01 RX ADMIN — LORAZEPAM 1 MG: 2 INJECTION INTRAMUSCULAR; INTRAVENOUS at 08:37

## 2019-01-01 RX ADMIN — GLYCOPYRROLATE 0.2 MG: 0.2 INJECTION, SOLUTION INTRAMUSCULAR; INTRAVENOUS at 08:37

## 2019-01-01 ASSESSMENT — ENCOUNTER SYMPTOMS
WHEEZING: 0
BLOOD IN STOOL: 0
SHORTNESS OF BREATH: 0
ABDOMINAL PAIN: 0
SINUS PRESSURE: 0
NAUSEA: 0
SHORTNESS OF BREATH: 0
DIARRHEA: 0
ABDOMINAL DISTENTION: 0
VOMITING: 0
BACK PAIN: 0
EYE PAIN: 0
NAUSEA: 0
CONSTIPATION: 0
VOMITING: 0
EYE DISCHARGE: 0
SORE THROAT: 0
EYE REDNESS: 0
DIARRHEA: 0
COUGH: 0
BACK PAIN: 0
RHINORRHEA: 0

## 2019-01-01 ASSESSMENT — PAIN SCALES - WONG BAKER
WONGBAKER_NUMERICALRESPONSE: 0

## 2019-01-01 ASSESSMENT — PAIN DESCRIPTION - PAIN TYPE
TYPE: ACUTE PAIN

## 2019-01-01 ASSESSMENT — PAIN DESCRIPTION - LOCATION
LOCATION: GENERALIZED
LOCATION: HEAD
LOCATION: GENERALIZED

## 2019-01-01 ASSESSMENT — PAIN SCALES - GENERAL
PAINLEVEL_OUTOF10: 0
PAINLEVEL_OUTOF10: 0
PAINLEVEL_OUTOF10: 8

## 2019-02-27 NOTE — TELEPHONE ENCOUNTER
Pt has appt on scheduled Monday 7/1/19 to see Norm Wood. The facility's transportation is not available on Mondays. Please call to reschedule appt.

## 2019-06-21 NOTE — ED NOTES
Attempt made to call [facility].  No answer at this time will attempt again      Rosibel Gomez RN  06/21/19 [id number]

## 2019-06-21 NOTE — CARE COORDINATION
Social Work [address] [place] [address] Planning:    Pt presents to the ED with numbness and tingling in her extremities. Pt is from [address] [place] [address]. SW met with pt who was sitting up in bed, alert and oriented x3. Pt is [place]. Pt reports she has lived at [facility] for the past 4 years. Pt reports she normally walks with a wheeled walker and has some recent falls. Pt reports she is a diabetic but is not on insulin. Pt reports her PCP is Dr Tyler Swenson and she has an appt with him on June 27th. Per ED physician, pt will be discharged. VICENTE set up transport back to [address] [place] [address] via [facility]. Be Marcelo RN, aware.

## 2019-06-21 NOTE — ED PROVIDER NOTES
the shoulders/trapezius but not the neck. Skin: Skin is warm and dry. Nursing note and vitals reviewed. Procedures    MDM  Number of Diagnoses or Management Options  Diagnosis management comments: Patient is a 80-year-old female presenting with numbness of the arms and shoulders. She states this started early this a.m. she has noticed this 1 time before which was back in October when she was diagnosed with a STEMI. However she states that it is not as severe as it was then. She is full range of motion both extremities and no decrease of motor strength. She denies chest pain or shortness of breath at this time. With her previous history of MI with similar symptoms there is concern for a cardiac event at this time. We will perform a cardiac work-up to rule out an acute event. ED Course as of Jun 21 1226 Fri Jun 21, 2019   1154 Patient is currently asymptomatic. Her numbness has resolved and she has no complaints other than being cold and having to pee a lot. We will wait for her urine and she will then be discharged. [AL]   1225 Urine completely negative. Patient is agreeable to going home.     [AL]      ED Course User Index  [AL] Brenda Finch DO       ED Course as of Jun 21 1226 Fri Jun 21, 2019   1154 Patient is currently asymptomatic. Her numbness has resolved and she has no complaints other than being cold and having to pee a lot. We will wait for her urine and she will then be discharged. [AL]   1225 Urine completely negative.  Patient is agreeable to going home.     [AL]      ED Course User Index  [AL] Brenda Finch DO       --------------------------------------------- PAST HISTORY ---------------------------------------------  Past Medical History:  has a past medical history of Arthritis, Cataract, Chest discomfort, Diabetes mellitus ([address] Environmental allergies, H/O food allergy, Hiatal hernia, Hypertension, Intermittent complete heart block ([address] LBBB (left bundle branch block), Neuropathy, Night sweats, Ringing in ears, Sinus trouble, Thyroid disease, Vertigo, and Wears glasses. Past Surgical History:  has a past surgical history that includes Tonsillectomy; joint replacement; Cholecystectomy; Appendectomy;  section; Inner ear surgery (1950); and other surgical history (2014). Social History:  reports that she quit smoking about 44 years ago. Her smoking use included cigarettes. She has never used smokeless tobacco. She reports that she does not drink alcohol or use drugs. Family History: family history is not on file. The patients home medications have been reviewed. Allergies: Hydralazine hcl; Peanuts [peanut oil]; Biaxin [clarithromycin]; Codeine; Cortisone; Darvocet [propoxyphene n-acetaminophen]; Darvon [propoxyphene hcl]; Demerol; Erythromycin; Morphine; Nsaids; Pcn [penicillins];  Procaine hcl; Ultram [tramadol hcl]; and Vioxx    -------------------------------------------------- RESULTS -------------------------------------------------  Labs:  Results for orders placed or performed during the hospital encounter of 19   CBC Auto Differential   Result Value Ref Range    WBC 5.9 4.5 - 11.5 E9/L    RBC 4.50 3.50 - 5.50 E12/L    Hemoglobin 14.6 11.5 - 15.5 g/dL    Hematocrit 44.7 34.0 - 48.0 %    MCV 99.3 80.0 - 99.9 fL    MCH 32.4 26.0 - 35.0 pg    MCHC 32.7 32.0 - 34.5 %    RDW 14.3 11.5 - 15.0 fL    Platelets 369 211 - 181 E9/L    MPV 9.9 7.0 - 12.0 fL    Neutrophils % 52.2 43.0 - 80.0 %    Immature Granulocytes % 0.5 0.0 - 5.0 %    Lymphocytes % 29.4 20.0 - 42.0 %    Monocytes % 13.6 (H) 2.0 - 12.0 %    Eosinophils % 3.6 0.0 - 6.0 %    Basophils % 0.7 0.0 - 2.0 %    Neutrophils # 3.08 1.80 - 7.30 E9/L    Immature Granulocytes # 0.03 E9/L    Lymphocytes # 1.73 1.50 - 4.00 E9/L    Monocytes # 0.80 0.10 - 0.95 E9/L    Eosinophils # 0.21 0.05 - 0.50 E9/L    Basophils # 0.04 0.00 - 0.20 Y3/M   Basic Metabolic Panel w/ Reflex to MG   Result Value Ref Range    Sodium 142 132 - 146 mmol/L    Potassium reflex Magnesium 4.4 3.5 - 5.0 mmol/L    Chloride 103 98 - 107 mmol/L    CO2 26 22 - 29 mmol/L    Anion Gap 13 7 - 16 mmol/L    Glucose 169 (H) 74 - 99 mg/dL    BUN 28 (H) 8 - 23 mg/dL    CREATININE 0.9 0.5 - 1.0 mg/dL    GFR Non-African American 58 >=60 mL/min/1.73    GFR African American >60     Calcium 9.9 8.6 - 10.2 mg/dL   Troponin   Result Value Ref Range    Troponin <0.01 0.00 - 0.03 ng/mL   Urinalysis, reflex to microscopic   Result Value Ref Range    Color, UA Yellow Straw/Yellow    Clarity, UA Clear Clear    Glucose, Ur Negative Negative mg/dL    Bilirubin Urine Negative Negative    Ketones, Urine Negative Negative mg/dL    Specific Gravity, UA 1.010 1.005 - 1.030    Blood, Urine Negative Negative    pH, UA 7.5 5.0 - 9.0    Protein, UA Negative Negative mg/dL    Urobilinogen, Urine 0.2 <2.0 E.U./dL    Nitrite, Urine Negative Negative    Leukocyte Esterase, Urine Negative Negative   EKG 12 Lead   Result Value Ref Range    Ventricular Rate 58 BPM    Atrial Rate 58 BPM    P-R Interval 194 ms    QRS Duration 134 ms    Q-T Interval 466 ms    QTc Calculation (Bazett) 457 ms    P Axis 66 degrees    R Axis 124 degrees    T Axis -33 degrees       Radiology:  XR CHEST PORTABLE   Final Result   No acute process                   ------------------------- NURSING NOTES AND VITALS REVIEWED ---------------------------  Date / Time Roomed:  6/21/2019  9:07 AM  ED Bed Assignment:  14B/14B-14    The nursing notes within the ED encounter and vital signs as below have been reviewed.    BP (!) 165/57   Pulse 60   Temp 96.9 °F (36.1 °C) (Infrared)   Resp 20   Ht 5' 3\" (1.6 m)   Wt 133 lb (60.3 kg)   SpO2 97%   BMI 23.56 kg/m²   Oxygen Saturation Interpretation: Normal      ------------------------------------------ PROGRESS NOTES ------------------------------------------  12:25 PM  I have spoken with the patient and discussed todays results, in addition to providing specific details for the plan of care and counseling regarding the diagnosis and prognosis. Their questions are answered at this time and they are agreeable with the plan. I discussed at length with them reasons for immediate return here for re evaluation. They will followup with their primary care physician by calling their office tomorrow. --------------------------------- ADDITIONAL PROVIDER NOTES ---------------------------------  At this time the patient is without objective evidence of an acute process requiring hospitalization or inpatient management. They have remained hemodynamically stable throughout their entire ED visit and are stable for discharge with outpatient follow-up. The plan has been discussed in detail and they are aware of the specific conditions for emergent return, as well as the importance of follow-up. New Prescriptions    No medications on file       Diagnosis:  1. Numbness    2. Polyuria        Disposition:  Patient's disposition: Discharge to home  Patient's condition is stable.        Brenda Finch, DO  Resident  06/21/19 0450

## 2019-06-29 NOTE — ED PROVIDER NOTES
drugs. Family History: family history is not on file. The patients home medications have been reviewed. Allergies: Hydralazine hcl; Peanuts [peanut oil]; Biaxin [clarithromycin]; Codeine; Cortisone; Darvocet [propoxyphene n-acetaminophen]; Darvon [propoxyphene hcl]; Demerol; Erythromycin; Morphine; Nsaids; Pcn [penicillins]; Procaine hcl; Ultram [tramadol hcl]; and Vioxx    -------------------------------------------------- RESULTS -------------------------------------------------    LABS:  Results for orders placed or performed during the hospital encounter of 06/29/19   EKG 12 Lead   Result Value Ref Range    Ventricular Rate 80 BPM    Atrial Rate 80 BPM    P-R Interval 182 ms    QRS Duration 146 ms    Q-T Interval 418 ms    QTc Calculation (Bazett) 482 ms    P Axis 70 degrees    R Axis 9 degrees    T Axis -65 degrees       RADIOLOGY:  XR Acute Abd Series Chest 1 VW   Final Result   No acute cardiopulmonary process      No free intraperitoneal air. Nonspecific bowel pattern, no evidence for obstruction. CT Head WO Contrast   Final Result   ALERT:  THIS IS AN ABNORMAL REPORT   Bilateral subdural hematomas with significant effacement of bilateral   sulci and filling in of the basilar cisterns suggesting impending   uncal herniation. 4 mm left to right midline shift. These findings were called to Brendon SMITH on 29 June 2019 at   1437 hours. Read back performed. CT Cervical Spine WO Contrast   Final Result   No acute fracture or dislocation. Degenerative spondylotic changes. Moderate to severe spinal canalicular stenosis at N5-2 related to   endplate spurring.           ------------------------- NURSING NOTES AND VITALS REVIEWED ---------------------------  Date / Time Roomed:  6/29/2019  1:19 PM  ED Bed Assignment:  [id number]    The nursing notes within the ED encounter and vital signs as below have been reviewed.      Patient Vitals for the past 24 hrs:   BP Temp Temp src Pulse

## 2019-06-29 NOTE — ED NOTES
Bed: 10  Expected date:   Expected time:   Means of arrival:   Comments:  EMS     Brenda Orosco RN  06/29/19 2198

## 2019-06-30 PROBLEM — I44.2 INTERMITTENT COMPLETE HEART BLOCK (HCC): Status: RESOLVED | Noted: 2017-07-20 | Resolved: 2019-01-01

## 2019-06-30 PROBLEM — S06.5XAA SUBDURAL HEMATOMA: Status: ACTIVE | Noted: 2019-01-01

## 2019-06-30 PROBLEM — Z86.73 HISTORY OF CVA (CEREBROVASCULAR ACCIDENT): Chronic | Status: ACTIVE | Noted: 2019-01-01

## 2019-06-30 PROBLEM — I10 ESSENTIAL HYPERTENSION: Chronic | Status: ACTIVE | Noted: 2019-01-01

## 2019-06-30 NOTE — H&P
Value Date    PHOS 2.7 03/31/2015     PT/INR:    Lab Results   Component Value Date    PROTIME 11.8 10/11/2018    INR 1.0 10/11/2018     Last 3 Troponin:    Lab Results   Component Value Date    TROPONINI <0.01 06/21/2019    TROPONINI 4.93 10/11/2018    TROPONINI 0.79 10/11/2018     U/A:    Lab Results   Component Value Date    COLORU Yellow 06/21/2019    PROTEINU Negative 06/21/2019    PHUR 7.5 06/21/2019    WBCUA 1-3 07/05/2016    RBCUA NONE 07/05/2016    RBCUA 0-1 06/20/2013    YEAST RARE 04/17/2013    BACTERIA FEW 07/05/2016    CLARITYU Clear 06/21/2019    SPECGRAV 1.010 06/21/2019    LEUKOCYTESUR Negative 06/21/2019    UROBILINOGEN 0.2 06/21/2019    BILIRUBINUR Negative 06/21/2019    BLOODU Negative 06/21/2019    GLUCOSEU Negative 06/21/2019    AMORPHOUS FEW 07/05/2016     ABG:  No results found for: PH, PCO2, PO2, HCO3, BE, THGB, TCO2, O2SAT  HgBA1c:    Lab Results   Component Value Date    LABA1C 5.9 03/27/2015     FLP:  No results found for: TRIG, HDL, LDLCALC, LDLDIRECT, LABVLDL  TSH:    Lab Results   Component Value Date    TSH 0.192 03/27/2015       XR Acute Abd Series Chest 1 VW   Final Result   No acute cardiopulmonary process      No free intraperitoneal air. Nonspecific bowel pattern, no evidence for obstruction. CT Head WO Contrast   Final Result   ALERT:  THIS IS AN ABNORMAL REPORT   Bilateral subdural hematomas with significant effacement of bilateral   sulci and filling in of the basilar cisterns suggesting impending   uncal herniation. 4 mm left to right midline shift. These findings were called to Blanca SMITH on 29 June 2019 at   1437 hours. Read back performed. CT Cervical Spine WO Contrast   Final Result   No acute fracture or dislocation. Degenerative spondylotic changes. Moderate to severe spinal canalicular stenosis at W0-8 related to   endplate spurring.              ASSESSMENT:      Principal Problem:    Subdural hematoma (HCC)  Active Problems:    Normal pressure

## 2019-07-01 NOTE — DISCHARGE INSTR - COC
Continuity of Care Form    Patient Name: Alejandra Hickman   :  1925  MRN:  [id number]    Admit date:  2019  Discharge date: 19    Code Status Order: [facility]   Advance Directives:   [facility] Documentation     Date/Time Healthcare Directive Type of Healthcare Directive Copy in [address] Agent's Name Healthcare Agent's Phone Number    [phone]  Yes, patient has an advance directive for healthcare treatment  Durable power of  for health care; Health care treatment directive; Living will  No, copy requested from family  Adult [address]  [phone]          Admitting Physician:  Dennys Dawkins MD  PCP: Haylie Lee MD    Discharging Nurse: MOISES GERMAIN [facility] Unit/Room#: 2287/0285-T  Discharging Unit Phone Number: [phone]    Emergency Contact:   Extended Emergency Contact Information  Primary Emergency Contact: Shannon Seamandy  Address: [address] Phone: [phone]  Mobile Phone: [phone]  Relation: Other  Secondary Emergency Contact: [address] Phone: [phone]  Relation: Other    Past Surgical History:  Past Surgical History:   Procedure Laterality Date    APPENDECTOMY       SECTION      CHOLECYSTECTOMY     [address]      knee    OTHER SURGICAL HISTORY  2014    intrathecal catheter insertion    TONSILLECTOMY         Immunization History: There is no immunization history on file for this patient.     Active Problems:  Patient Active Problem List   Diagnosis Code    Normal pressure hydrocephalus s/p lumbar drain insertion G91.2    LBBB (left bundle branch block) I44.7    Essential hypertension I10    History of CVA (cerebrovascular accident) Z86.73    Subdural hematoma ([address] [id number]       Isolation/Infection:   Isolation          No Isolation            Nurse Assessment:  Last Vital

## 2019-08-05 NOTE — PLAN OF CARE
Problem: Falls - Risk of:  Goal: Will remain free from falls  Will remain free from falls   Outcome: Met This Shift      Problem: Risk for Impaired Skin Integrity  Goal: Tissue integrity - skin and mucous membranes  Structural intactness and normal physiological function of skin and  mucous membranes.    Outcome: Met This Shift yesterday

## 2024-04-06 NOTE — ED NOTES
Family at bedside. Daughter has stated thayt she is OK with patient going to hospice. She also stated that she had some questions. Contacted Hospice and am awaiting return call from nurse on call.       Lala Cornelius RN  06/29/19 2801 Do Not Resuscitate (DNR)